# Patient Record
Sex: FEMALE | Race: BLACK OR AFRICAN AMERICAN | NOT HISPANIC OR LATINO | URBAN - METROPOLITAN AREA
[De-identification: names, ages, dates, MRNs, and addresses within clinical notes are randomized per-mention and may not be internally consistent; named-entity substitution may affect disease eponyms.]

---

## 2021-07-13 ENCOUNTER — OFFICE (OUTPATIENT)
Dept: URBAN - METROPOLITAN AREA CLINIC 11 | Facility: CLINIC | Age: 81
End: 2021-07-13

## 2021-07-13 VITALS
WEIGHT: 103 LBS | OXYGEN SATURATION: 99 % | HEART RATE: 78 BPM | DIASTOLIC BLOOD PRESSURE: 70 MMHG | HEIGHT: 62 IN | SYSTOLIC BLOOD PRESSURE: 180 MMHG

## 2021-07-13 DIAGNOSIS — R63.4 ABNORMAL WEIGHT LOSS: ICD-10-CM

## 2021-07-13 DIAGNOSIS — R05 COUGH: ICD-10-CM

## 2021-07-13 DIAGNOSIS — J45.909 UNSPECIFIED ASTHMA, UNCOMPLICATED: ICD-10-CM

## 2021-07-13 DIAGNOSIS — K52.9 NONINFECTIVE GASTROENTERITIS AND COLITIS, UNSPECIFIED: ICD-10-CM

## 2021-07-13 LAB
C-REACTIVE PROTEIN, QUANT: 13 MG/L — HIGH (ref 0–10)
CBC, PLATELET, NO DIFFERENTIAL: HEMATOCRIT: 30.6 % — LOW (ref 34–46.6)
CBC, PLATELET, NO DIFFERENTIAL: HEMOGLOBIN: 9.9 G/DL — LOW (ref 11.1–15.9)
CBC, PLATELET, NO DIFFERENTIAL: MCH: 27.3 PG (ref 26.6–33)
CBC, PLATELET, NO DIFFERENTIAL: MCHC: 32.4 G/DL (ref 31.5–35.7)
CBC, PLATELET, NO DIFFERENTIAL: MCV: 85 FL (ref 79–97)
CBC, PLATELET, NO DIFFERENTIAL: PLATELETS: 309 X10E3/UL (ref 150–450)
CBC, PLATELET, NO DIFFERENTIAL: RBC: 3.62 X10E6/UL — LOW (ref 3.77–5.28)
CBC, PLATELET, NO DIFFERENTIAL: RDW: 15 % (ref 11.7–15.4)
CBC, PLATELET, NO DIFFERENTIAL: WBC: 8.3 X10E3/UL (ref 3.4–10.8)
COMP. METABOLIC PANEL (14): A/G RATIO: 1.4 (ref 1.2–2.2)
COMP. METABOLIC PANEL (14): ALBUMIN: 4.2 G/DL (ref 3.6–4.6)
COMP. METABOLIC PANEL (14): ALKALINE PHOSPHATASE: 63 IU/L (ref 48–121)
COMP. METABOLIC PANEL (14): ALT (SGPT): 10 IU/L (ref 0–32)
COMP. METABOLIC PANEL (14): AST (SGOT): 20 IU/L (ref 0–40)
COMP. METABOLIC PANEL (14): BILIRUBIN, TOTAL: 0.6 MG/DL (ref 0–1.2)
COMP. METABOLIC PANEL (14): BUN/CREATININE RATIO: 16 (ref 12–28)
COMP. METABOLIC PANEL (14): BUN: 28 MG/DL — HIGH (ref 8–27)
COMP. METABOLIC PANEL (14): CALCIUM: 9.7 MG/DL (ref 8.7–10.3)
COMP. METABOLIC PANEL (14): CARBON DIOXIDE, TOTAL: 26 MMOL/L (ref 20–29)
COMP. METABOLIC PANEL (14): CHLORIDE: 98 MMOL/L (ref 96–106)
COMP. METABOLIC PANEL (14): CREATININE: 1.7 MG/DL — HIGH (ref 0.57–1)
COMP. METABOLIC PANEL (14): EGFR IF AFRICN AM: 32 ML/MIN/1.73 — LOW (ref 59–?)
COMP. METABOLIC PANEL (14): EGFR IF NONAFRICN AM: 28 ML/MIN/1.73 — LOW (ref 59–?)
COMP. METABOLIC PANEL (14): GLOBULIN, TOTAL: 3 G/DL (ref 1.5–4.5)
COMP. METABOLIC PANEL (14): GLUCOSE: 79 MG/DL (ref 65–99)
COMP. METABOLIC PANEL (14): POTASSIUM: 3.2 MMOL/L — LOW (ref 3.5–5.2)
COMP. METABOLIC PANEL (14): PROTEIN, TOTAL: 7.2 G/DL (ref 6–8.5)
COMP. METABOLIC PANEL (14): SODIUM: 140 MMOL/L (ref 134–144)

## 2021-07-13 PROCEDURE — 99204 OFFICE O/P NEW MOD 45 MIN: CPT

## 2021-07-13 RX ORDER — POLYETHYLENE GLYCOL 3350, SODIUM SULFATE, SODIUM CHLORIDE, POTASSIUM CHLORIDE, ASCORBIC ACID, SODIUM ASCORBATE 140-9-5.2G
KIT ORAL
Qty: 1 | Refills: 0 | Status: ACTIVE
Start: 2021-07-13

## 2021-07-13 NOTE — SERVICENOTES
Given her significant unintentional weight loss and her last colonoscopy performed 10 years ago, I recommended initial evaluation of diarrhea with a colonoscopy.  I recommended an EGD at that time for small bowel biopsies.  I discussed with her that the differential for chronic diarrhea is quite extensive.  She will need colon biopsies to assess for possible microscopic colitis or inflammatory bowel disease. I discussed her weight loss is concerning for possible neoplasia.  I discussed the procedure as well as risks versus benefits, risks including bleeding, perforation, splenic injury, aspiration, complications from sedation. Labs and stool studies as ordered above.  If endoscopic evaluation is unremarkable, will do additional evaluation including more extensive laboratory/stool studies.  I recommended protein supplements such as Ensure complete (350 adela/30 gram protein per container) to promote weight gain.  She had some bilateral wheezing in the lower lung fields on exam.  While this may just represent mild exacerbation of her asthma I recommended a chest x-ray to evaluate for possible pneumonia or other pulmonary etiology.

## 2021-07-13 NOTE — SERVICEHPINOTES
80-year-old female who I am meeting for the 1st time today. She was referred by her PCP Dr. Hutton for diarrhea.  She states she was having regular bowel movements last year.  Sometime in 2021 she started having diarrhea and it has been going on for longer than a month.  She typically has 2-3 bowel movements per day that are either loose or solid.  There is occasional fecal urgency and there have been episodes of fecal incontinence.  There is nocturnal stooling as well.  Sometimes the diarrhea occurs after eating.  She notes certain foods may result in solid bowel movements, such as potato soup.  She denies fever or chills, abdominal pain, or hematochezia or melena.  She has lost a significant amount of weight unintentionally and states she was 190 lbs last December. She denies any antecedent changes in medications, antibiotic use, or significant dietary changes. She has a stationary bike at home and rides for 10 minutes daily but denies change in exercise level.  She states she had an EGD and colonoscopy approximately 10 years ago and states it was normal. She does not recall who performed her EGD/colonoscopy. She denies prior colon polyps. She denies family history of colon cancer polyps.  Of note she states she used to be a  and has a history of asthma and bronchitis.  She tends to have increased coughing and sinus drainage with rainy weather. She states she has not used her inhaler recently. She has some recent coughing but denies recent fever or chills.